# Patient Record
Sex: MALE | Race: WHITE | NOT HISPANIC OR LATINO | URBAN - METROPOLITAN AREA
[De-identification: names, ages, dates, MRNs, and addresses within clinical notes are randomized per-mention and may not be internally consistent; named-entity substitution may affect disease eponyms.]

---

## 2020-10-12 PROBLEM — Z00.00 ENCOUNTER FOR PREVENTIVE HEALTH EXAMINATION: Status: ACTIVE | Noted: 2020-10-12

## 2020-10-14 ENCOUNTER — OUTPATIENT (OUTPATIENT)
Dept: OUTPATIENT SERVICES | Age: 20
LOS: 1 days | End: 2020-10-14

## 2020-10-14 ENCOUNTER — APPOINTMENT (OUTPATIENT)
Dept: MRI IMAGING | Facility: HOSPITAL | Age: 20
End: 2020-10-14
Payer: COMMERCIAL

## 2020-10-14 DIAGNOSIS — Q26.9 CONGENITAL MALFORMATION OF GREAT VEIN, UNSPECIFIED: ICD-10-CM

## 2020-10-14 PROCEDURE — 75561 CARDIAC MRI FOR MORPH W/DYE: CPT | Mod: 26

## 2021-04-22 ENCOUNTER — OUTPATIENT (OUTPATIENT)
Dept: OUTPATIENT SERVICES | Facility: HOSPITAL | Age: 21
LOS: 1 days | End: 2021-04-22
Payer: COMMERCIAL

## 2021-04-22 VITALS
TEMPERATURE: 98 F | WEIGHT: 154.98 LBS | HEART RATE: 96 BPM | DIASTOLIC BLOOD PRESSURE: 86 MMHG | RESPIRATION RATE: 14 BRPM | SYSTOLIC BLOOD PRESSURE: 128 MMHG | OXYGEN SATURATION: 98 % | HEIGHT: 72 IN

## 2021-04-22 DIAGNOSIS — Q26.8 OTHER CONGENITAL MALFORMATIONS OF GREAT VEINS: ICD-10-CM

## 2021-04-22 DIAGNOSIS — Z98.890 OTHER SPECIFIED POSTPROCEDURAL STATES: Chronic | ICD-10-CM

## 2021-04-22 LAB
ANION GAP SERPL CALC-SCNC: 11 MMOL/L — SIGNIFICANT CHANGE UP (ref 7–14)
BLD GP AB SCN SERPL QL: NEGATIVE — SIGNIFICANT CHANGE UP
BUN SERPL-MCNC: 16 MG/DL — SIGNIFICANT CHANGE UP (ref 7–23)
CALCIUM SERPL-MCNC: 9.6 MG/DL — SIGNIFICANT CHANGE UP (ref 8.4–10.5)
CHLORIDE SERPL-SCNC: 99 MMOL/L — SIGNIFICANT CHANGE UP (ref 98–107)
CO2 SERPL-SCNC: 29 MMOL/L — SIGNIFICANT CHANGE UP (ref 22–31)
CREAT SERPL-MCNC: 0.91 MG/DL — SIGNIFICANT CHANGE UP (ref 0.5–1.3)
GLUCOSE SERPL-MCNC: 86 MG/DL — SIGNIFICANT CHANGE UP (ref 70–99)
HCT VFR BLD CALC: 46 % — SIGNIFICANT CHANGE UP (ref 39–50)
HGB BLD-MCNC: 15.7 G/DL — SIGNIFICANT CHANGE UP (ref 13–17)
MCHC RBC-ENTMCNC: 29.5 PG — SIGNIFICANT CHANGE UP (ref 27–34)
MCHC RBC-ENTMCNC: 34.1 GM/DL — SIGNIFICANT CHANGE UP (ref 32–36)
MCV RBC AUTO: 86.5 FL — SIGNIFICANT CHANGE UP (ref 80–100)
NRBC # BLD: 0 /100 WBCS — SIGNIFICANT CHANGE UP
NRBC # FLD: 0 K/UL — SIGNIFICANT CHANGE UP
PLATELET # BLD AUTO: 281 K/UL — SIGNIFICANT CHANGE UP (ref 150–400)
POTASSIUM SERPL-MCNC: 4.3 MMOL/L — SIGNIFICANT CHANGE UP (ref 3.5–5.3)
POTASSIUM SERPL-SCNC: 4.3 MMOL/L — SIGNIFICANT CHANGE UP (ref 3.5–5.3)
RBC # BLD: 5.32 M/UL — SIGNIFICANT CHANGE UP (ref 4.2–5.8)
RBC # FLD: 11.7 % — SIGNIFICANT CHANGE UP (ref 10.3–14.5)
RH IG SCN BLD-IMP: POSITIVE — SIGNIFICANT CHANGE UP
SODIUM SERPL-SCNC: 139 MMOL/L — SIGNIFICANT CHANGE UP (ref 135–145)
WBC # BLD: 12.23 K/UL — HIGH (ref 3.8–10.5)
WBC # FLD AUTO: 12.23 K/UL — HIGH (ref 3.8–10.5)

## 2021-04-22 PROCEDURE — 93010 ELECTROCARDIOGRAM REPORT: CPT

## 2021-04-22 RX ORDER — ERGOCALCIFEROL 1.25 MG/1
1 CAPSULE ORAL
Qty: 0 | Refills: 0 | DISCHARGE

## 2021-04-22 RX ORDER — SERTRALINE 25 MG/1
1 TABLET, FILM COATED ORAL
Qty: 0 | Refills: 0 | DISCHARGE

## 2021-04-22 NOTE — H&P PST ADULT - SYMPTOMS
SOB with over exertion- takes 5 minutes to recover , sometimes develops chest tightness if pt has been inactive for a few weeks/dyspnea

## 2021-04-22 NOTE — H&P PST ADULT - NSICDXFAMILYHX_GEN_ALL_CORE_FT
FAMILY HISTORY:  Mother  Still living? Yes, Estimated age: Age Unknown  FH: HTN (hypertension), Age at diagnosis: Age Unknown    Grandparent  Still living? No  FH: type 2 diabetes, Age at diagnosis: Age Unknown

## 2021-04-22 NOTE — H&P PST ADULT - NSANTHNECKRD_ENT_A_CORE
Shelby Alexander  [address] [facility] [address]  [place] [address]  Phone: [phone]  Fax: (   )    -  Follow Up Time:     Wong Marinelli  [address] [place] [id number]  Phone: [phone]  Fax: (   )    -  Follow Up Time:
No

## 2021-04-22 NOTE — H&P PST ADULT - ASSESSMENT
Problem: congenital malformation of great vein  Assessment and Plan: Patient scheduled for surgery on 5/3/21  Patient provided with verbal and written presurgical instructions; verbalized understanding  with teach back.    Patient provided with famotidine for GI prophylaxis; verbalized understanding.    Patient provided with Chlorhexidine wash, verbal and written instructions reviewed. Patient demonstrated understanding with teach back.   Patient confirmed COVID appointment

## 2021-04-22 NOTE — H&P PST ADULT - HISTORY OF PRESENT ILLNESS
21 yr old male presents to PST with preop dx of congential malformation of great vein scheduled for cardiac cath; patient reports history of respiratory infections and coughs with long durations, as a child diagnosed with asthma, in high school patient played soccer and football with some shortness of breath lasting longer than team mates; in college patient developed a month long cough, imagining noted congenital malformation of right pulmonary veins and diagnosed with right pulmonary atresia , MR cardiac noted small right pulmonary vein

## 2021-04-22 NOTE — H&P PST ADULT - CARDIOVASCULAR COMMENTS
patient is generally active but states if he has not been very active with exercise and "starts running, will need 5 minute to recover"

## 2021-04-26 DIAGNOSIS — Z01.818 ENCOUNTER FOR OTHER PREPROCEDURAL EXAMINATION: ICD-10-CM

## 2021-04-29 ENCOUNTER — APPOINTMENT (OUTPATIENT)
Dept: PEDIATRIC CARDIOLOGY | Facility: CLINIC | Age: 21
End: 2021-04-29
Payer: COMMERCIAL

## 2021-04-29 VITALS
WEIGHT: 154.98 LBS | BODY MASS INDEX: 20.99 KG/M2 | DIASTOLIC BLOOD PRESSURE: 86 MMHG | HEIGHT: 72.01 IN | RESPIRATION RATE: 14 BRPM | HEART RATE: 96 BPM | OXYGEN SATURATION: 98 % | SYSTOLIC BLOOD PRESSURE: 128 MMHG

## 2021-04-29 DIAGNOSIS — Q26.8 OTHER CONGENITAL MALFORMATIONS OF GREAT VEINS: ICD-10-CM

## 2021-04-29 DIAGNOSIS — Z78.9 OTHER SPECIFIED HEALTH STATUS: ICD-10-CM

## 2021-04-29 PROCEDURE — 99072 ADDL SUPL MATRL&STAF TM PHE: CPT

## 2021-04-29 PROCEDURE — 93303 ECHO TRANSTHORACIC: CPT

## 2021-04-29 PROCEDURE — 93325 DOPPLER ECHO COLOR FLOW MAPG: CPT

## 2021-04-29 PROCEDURE — 93320 DOPPLER ECHO COMPLETE: CPT

## 2021-04-29 RX ORDER — SERTRALINE HYDROCHLORIDE 100 MG/1
100 TABLET, FILM COATED ORAL
Refills: 0 | Status: ACTIVE | COMMUNITY

## 2021-04-30 ENCOUNTER — APPOINTMENT (OUTPATIENT)
Dept: PEDIATRIC SURGERY | Facility: CLINIC | Age: 21
End: 2021-04-30

## 2021-04-30 LAB — SARS-COV-2 N GENE NPH QL NAA+PROBE: NOT DETECTED

## 2021-05-03 ENCOUNTER — OUTPATIENT (OUTPATIENT)
Dept: OUTPATIENT SERVICES | Facility: HOSPITAL | Age: 21
LOS: 1 days | Discharge: HOME | End: 2021-05-03
Payer: COMMERCIAL

## 2021-05-03 VITALS
SYSTOLIC BLOOD PRESSURE: 95 MMHG | HEART RATE: 89 BPM | OXYGEN SATURATION: 97 % | DIASTOLIC BLOOD PRESSURE: 56 MMHG | RESPIRATION RATE: 22 BRPM | TEMPERATURE: 97 F

## 2021-05-03 VITALS
DIASTOLIC BLOOD PRESSURE: 72 MMHG | RESPIRATION RATE: 24 BRPM | SYSTOLIC BLOOD PRESSURE: 112 MMHG | TEMPERATURE: 97 F | OXYGEN SATURATION: 99 % | HEART RATE: 70 BPM

## 2021-05-03 DIAGNOSIS — Z98.890 OTHER SPECIFIED POSTPROCEDURAL STATES: Chronic | ICD-10-CM

## 2021-05-03 DIAGNOSIS — I50.9 HEART FAILURE, UNSPECIFIED: ICD-10-CM

## 2021-05-03 PROBLEM — Q26.9: Chronic | Status: ACTIVE | Noted: 2021-04-22

## 2021-05-03 PROBLEM — F32.9 MAJOR DEPRESSIVE DISORDER, SINGLE EPISODE, UNSPECIFIED: Chronic | Status: ACTIVE | Noted: 2021-04-22

## 2021-05-03 PROCEDURE — 93568 NJX CAR CTH NSLC P-ART ANGRP: CPT | Mod: 59

## 2021-05-03 PROCEDURE — 93531: CPT | Mod: 26

## 2021-05-03 PROCEDURE — 76937 US GUIDE VASCULAR ACCESS: CPT | Mod: 26

## 2021-05-03 PROCEDURE — 75774 ARTERY X-RAY EACH VESSEL: CPT | Mod: 26,59

## 2021-05-03 PROCEDURE — 75827 VEIN X-RAY CHEST: CPT | Mod: 26

## 2021-05-03 RX ORDER — SODIUM CHLORIDE 9 MG/ML
3 INJECTION INTRAMUSCULAR; INTRAVENOUS; SUBCUTANEOUS EVERY 8 HOURS
Refills: 0 | Status: DISCONTINUED | OUTPATIENT
Start: 2021-05-03 | End: 2021-05-17

## 2021-05-03 RX ORDER — SODIUM CHLORIDE 9 MG/ML
500 INJECTION INTRAMUSCULAR; INTRAVENOUS; SUBCUTANEOUS
Refills: 0 | Status: DISCONTINUED | OUTPATIENT
Start: 2021-05-03 | End: 2021-05-17

## 2021-05-03 RX ORDER — SODIUM CHLORIDE 9 MG/ML
250 INJECTION INTRAMUSCULAR; INTRAVENOUS; SUBCUTANEOUS ONCE
Refills: 0 | Status: COMPLETED | OUTPATIENT
Start: 2021-05-03 | End: 2021-05-03

## 2021-05-03 RX ADMIN — SODIUM CHLORIDE 3 MILLILITER(S): 9 INJECTION INTRAMUSCULAR; INTRAVENOUS; SUBCUTANEOUS at 15:10

## 2021-05-03 RX ADMIN — SODIUM CHLORIDE 500 MILLILITER(S): 9 INJECTION INTRAMUSCULAR; INTRAVENOUS; SUBCUTANEOUS at 13:45

## 2021-05-03 RX ADMIN — SODIUM CHLORIDE 75 MILLILITER(S): 9 INJECTION INTRAMUSCULAR; INTRAVENOUS; SUBCUTANEOUS at 11:43

## 2021-05-03 NOTE — PROCEDURE NOTE - ADDITIONAL PROCEDURE DETAILS
Access: 5Fr RFA, 7Fr RFV  Sat(%): CO 5.6L/min no intracardiac shunting. RPA 94% compared to LPA 79%  Pressure(mmHg): Normal R heart filing pressures. Normal L heart pressures with no gradient on pullback. L PCWp = LVEDP.   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 RPA 94% compared to LPA 79%  Pressure(mmHg): Normal R heart filing pressures. Normal L heart pressures with no gradient on pullback. L PCWp = LVEDP = R PCWp = ~8-9 mmHg. Normal baseline b/l PA pressures. RPA pressure increased after angiography, but returned to normal by end of case.   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 RPA 94% compared to LPA 79%  Pressure (mmHg): Normal R heart filing pressures. Normal baseline b/l PA pressures. Normal L heart pressures with no gradient on pullback. L PCWp = LVEDP = ~8-9 mmHg; markeldy elevated RVEDp =mRPA; RPA pressure increased after angiography, but returned to normal by end of case.   Angiogram: Hypoplastic RPA. Intrapulmonary collateralization between segments with each lobe of the right lung  A&P: Kevyn is a 21yoM with R pulmonary vein atresia s/p diagnostic cath which reveals intrapulmonary collateraleralization of R lung.  - to [facility] PACU  - Lie flat for 5 hours  - regular diet  - F/u with primary cardiologist in 1 month  - above reviewed with family/patient, primary cardiologist, and Adult PACU team

## 2021-05-11 NOTE — REASON FOR VISIT
[Initial Consultation] : an initial consultation for [Patient] : patient [FreeTextEntry1] : Right Pulmonary Vein Atresia

## 2021-05-11 NOTE — DISCUSSION/SUMMARY
[PE + No Restrictions] : [unfilled] may participate in the entire physical education program without restriction, including all varsity competitive sports. [FreeTextEntry1] : We had the pleasure of seeing Kevyn on 4/29/21 at the [facility] at [facility], who was referred for diagnostic cardiac catheterization in the setting of possible R pulmonary venous atresia. We agree with the plan to go ahead with the catherization procedure to better delineate the anatomy and futher elucidate if any interventions could be offered to Kevyn. Overall, however, he is thriving and does not appear to have significant sequelae of this anatomy without exercise restriciton or restrictoins to his activities of daily living. He has a history of recurrent pneumonia's, but not recently. We educated the patient about the risks and benefits of the procedure. Kevyn agreed to proceed with the procedure after asking appropriate question, and consent was signed. He verbally provided consent to discussing case with Mom after the case. We will update you after his cardiac catheterization, which is scheduled for May 3, 2021.\par  [Needs SBE Prophylaxis] : [unfilled] does not need bacterial endocarditis prophylaxis

## 2021-05-11 NOTE — CONSULT LETTER
[Today's Date] : [unfilled] [Name] : Name: [unfilled] [] : : ~~ [Today's Date:] : [unfilled] [Dear  ___:] : Dear Dr. [unfilled]: [Consult] : I had the pleasure of evaluating your patient, [unfilled]. My full evaluation follows. [Sincerely,] : Sincerely, [Consult - Multiple Provider] : Thank you very much for allowing us to participate in the care of this patient. If you have any questions, please do not hesitate to contact us. [FreeTextEntry4] : Dr. Gabriel Roman [FreeTextEntry5] : [facility] [FreeTextEntry6] : [phone] [de-identified] : Neri Alejandra MD, MPH\par Pediatric Cardiology Fellow

## 2021-05-11 NOTE — CLINICAL NARRATIVE
[Up to Date] : Up to Date [FreeTextEntry2] : Patient came for pre procedure appointment. Instruction given and verbalizes understanding.

## 2021-05-11 NOTE — HISTORY OF PRESENT ILLNESS
[FreeTextEntry1] : Kevyn is a 21yoM with no significant PMHx referred for cardiac catherization in the setting of possible R pulmonary vein atresia.\par \par Kevyn reports that throughout his life he has had recurrent R sided pneumonia's and episodes of bronchitis. As a child he was also he told he had asthma. During a recent episode he had imaging which was concerning for a small R sided lung. He has since undergone a Cardiac MRI which revealed a hypoplastic RPA with out right sided pulmonary venous drainage returning to the left atrium. There is possible a connection between the right side to the left veins, but the imaging is unclear. \par \par PMHx - recurrent R sided pneumonia\par PSHx - orchiopexy ~ 8 weeks ago. Denies any issues with anethesia.\par Social History - works in [employer]

## 2021-05-11 NOTE — CARDIOLOGY SUMMARY
[Normal] : normal [de-identified] : 8/16/20 [FreeTextEntry1] : NSR, HR 69bpm [de-identified] : 4/30/21 [FreeTextEntry2] : SVC and IVC confluent to morphologic RA without evidence of dilation.\par Dilated LUPV with narrowing at the LA junction\par On 2D imaging, normal sized RPA confluent with MPA, but on color interrogation no flow seen into the RPA\par  [de-identified] : 10/14/2020 [de-identified] : Right sided pulmonary vein atresia. Diffusely small right pulmonary artery. No larger collateral to systemic system obviously identified. There is a network of paravertebral veins in the right mid lung field. Paucity and delayed arborization to the righ lunf field. Differential flow estiamte 24% to RPA nd 76% to LPA using total lows and 15% to RPa and 85% to LPA using effective flows.\par \par Normal R and L ventricular size and function.

## 2021-05-14 ENCOUNTER — TRANSCRIPTION ENCOUNTER (OUTPATIENT)
Age: 21
End: 2021-05-14

## 2021-05-26 ENCOUNTER — TRANSCRIPTION ENCOUNTER (OUTPATIENT)
Age: 21
End: 2021-05-26